# Patient Record
Sex: MALE | Race: ASIAN | NOT HISPANIC OR LATINO
[De-identification: names, ages, dates, MRNs, and addresses within clinical notes are randomized per-mention and may not be internally consistent; named-entity substitution may affect disease eponyms.]

---

## 2018-08-20 ENCOUNTER — APPOINTMENT (OUTPATIENT)
Dept: OPHTHALMOLOGY | Facility: CLINIC | Age: 23
End: 2018-08-20
Payer: COMMERCIAL

## 2018-08-20 PROCEDURE — 92002 INTRM OPH EXAM NEW PATIENT: CPT

## 2018-08-20 PROCEDURE — 87809 ADENOVIRUS ASSAY W/OPTIC: CPT | Mod: QW

## 2018-08-20 RX ORDER — FLUOROMETHOLONE 1 MG/ML
0.1 SOLUTION/ DROPS OPHTHALMIC
Qty: 1 | Refills: 1 | Status: ACTIVE | COMMUNITY
Start: 2018-08-20 | End: 1900-01-01

## 2018-08-29 ENCOUNTER — APPOINTMENT (OUTPATIENT)
Dept: OPHTHALMOLOGY | Facility: CLINIC | Age: 23
End: 2018-08-29
Payer: COMMERCIAL

## 2018-08-29 PROCEDURE — 92012 INTRM OPH EXAM EST PATIENT: CPT

## 2018-09-14 ENCOUNTER — APPOINTMENT (OUTPATIENT)
Dept: OPHTHALMOLOGY | Facility: CLINIC | Age: 23
End: 2018-09-14
Payer: COMMERCIAL

## 2018-09-14 PROCEDURE — 92012 INTRM OPH EXAM EST PATIENT: CPT

## 2018-09-25 ENCOUNTER — APPOINTMENT (OUTPATIENT)
Dept: OPHTHALMOLOGY | Facility: CLINIC | Age: 23
End: 2018-09-25
Payer: COMMERCIAL

## 2018-09-25 DIAGNOSIS — H18.20 UNSPECIFIED CORNEAL EDEMA: ICD-10-CM

## 2018-09-25 DIAGNOSIS — B30.0 KERATOCONJUNCTIVITIS DUE TO ADENOVIRUS: ICD-10-CM

## 2018-09-25 PROCEDURE — 92012 INTRM OPH EXAM EST PATIENT: CPT

## 2018-09-25 PROCEDURE — 92285 EXTERNAL OCULAR PHOTOGRAPHY: CPT

## 2023-12-18 ENCOUNTER — NON-APPOINTMENT (OUTPATIENT)
Age: 28
End: 2023-12-18

## 2023-12-18 ENCOUNTER — APPOINTMENT (OUTPATIENT)
Dept: ORTHOPEDIC SURGERY | Facility: CLINIC | Age: 28
End: 2023-12-18
Payer: COMMERCIAL

## 2023-12-18 ENCOUNTER — TRANSCRIPTION ENCOUNTER (OUTPATIENT)
Age: 28
End: 2023-12-18

## 2023-12-18 DIAGNOSIS — M23.92 UNSPECIFIED INTERNAL DERANGEMENT OF LEFT KNEE: ICD-10-CM

## 2023-12-18 DIAGNOSIS — M25.362 OTHER INSTABILITY, LEFT KNEE: ICD-10-CM

## 2023-12-18 PROCEDURE — 99204 OFFICE O/P NEW MOD 45 MIN: CPT

## 2023-12-18 PROCEDURE — 73564 X-RAY EXAM KNEE 4 OR MORE: CPT | Mod: LT

## 2023-12-18 NOTE — HISTORY OF PRESENT ILLNESS
[de-identified] : 28-year-old male presents complaining of left lateral knee pain that worsened over the past few days. He denies recent injury. He notes that he had left ACL repair in 2018 in [place]. He has had intermittent discomfort over the past few years. He notes posterolateral joint line pain, which worsens with extension. He has full and painless flexion of his knee. He notes his left knee is constantly buckling recently.  He denies swelling. His knee yocasta when he runs and jumps. He has pain when sleeping if it is not elevated. He states he has not been playing basketball since the spring. The patient brought his arthroscopic pictures with him and we went over them in great detail.

## 2023-12-18 NOTE — DISCUSSION/SUMMARY
[de-identified] : I went over the pathophysiology of the patient's symptoms in great detail with the patient. I discussed the underlying pathophysiology of the patient's condition in great detail with the patient. I went over the patient's x-rays with them in great detail. We are requesting authorization for an MRI with metal suppression of the patients left knee. He cannot return to sports because of instability. The patient gets swelling of the left knee due to instability and he gets buckling on a sedentary basis.   All of their questions were answered. They understand and consent to the plan.   FU after MRI

## 2023-12-18 NOTE — ADDENDUM
[FreeTextEntry1] : I, CHALINO PHILLIPS, acted solely as a scribe for Dr. Bradly Andujar on this date 12/18/2023.  All medical record entries made by the Scribe were at my, Dr. Bradly Andujar, direction and personally dictated by me on 12/18/2023. I have reviewed the chart and agree that the record accurately reflects my personal performance of the history, physical exam, assessment and plan. I have also personally directed, reviewed, and agreed with the chart.

## 2023-12-18 NOTE — PHYSICAL EXAM
[de-identified] : Constitutional o Appearance : well-nourished, well developed, alert, in no acute distress  Head and Face o Head :  Inspection : atraumatic, normocephalic o Face :  Inspection : no visible rash or discoloration Respiratory o Respiratory Effort: breathing unlabored  Neurologic o Mental Status Examination :  Orientation : grossly oriented to person, place and time Psychiatric o Mood and Affect: mood normal, affect appropriate   Right Lower Extremity o Buttock : no tenderness, swelling or deformities  o Right Hip :  Inspection/Palpation : no tenderness, swelling or deformities  Range of Motion : full and painless in all planes, no crepitance  Stability : joint stability intact  Strength : extension, flexion, adduction, abduction, internal rotation and external rotation 5/5   o Right Knee :  Inspection/Palpation : no tenderness to palpation, no swelling  Range of Motion : active flexion and extension full and painless, no crepitance  Stability : no valgus or varus instability present on provocative testing  Strength : flexion and extension 5/5  Tests and Signs : Anterior Drawer negative, Lachman negative, Ibrahima's negative  Left Lower Extremity o Buttock : no tenderness, swelling or deformities  o Left Hip :  Inspection/Palpation : no tenderness, no swelling or deformities  Range of Motion : full and painless in all planes, no crepitance  Stability : joint stability intact  Strength : extension, flexion, adduction, abduction, internal rotation and external rotation 5/5  o Left Knee :  Inspection/Palpation : medial compartment tenderness  to palpation, mild swelling, well-healed incision hamstring graph, palpable plicathat is not painful, pivot slide,   Range of Motion : pain with extension, no crepitance, good patellofemoral glide   Stability : mild valgus or varus instability present on provocative testing  Strength : flexion and extension 5/5  Tests and Signs : Anterior Drawer positive, Lachman negative, Ibrahima's negative  Gait: normal gait, no significant extremity swelling or lymphedema  Radiology Results: Left Knee: Standing AP, lateral, tunnel and skyline views were obtained and reveal evidence of ACL reconstruction with endo button of the tibia and femur and no significant degenerative change

## 2024-01-02 ENCOUNTER — APPOINTMENT (OUTPATIENT)
Dept: MRI IMAGING | Facility: CLINIC | Age: 29
End: 2024-01-02

## 2024-01-02 ENCOUNTER — APPOINTMENT (OUTPATIENT)
Dept: MRI IMAGING | Facility: CLINIC | Age: 29
End: 2024-01-02
Payer: COMMERCIAL

## 2024-01-02 PROCEDURE — 73721 MRI JNT OF LWR EXTRE W/O DYE: CPT | Mod: LT

## 2024-01-08 ENCOUNTER — NON-APPOINTMENT (OUTPATIENT)
Age: 29
End: 2024-01-08

## 2024-01-11 ENCOUNTER — APPOINTMENT (OUTPATIENT)
Dept: ORTHOPEDIC SURGERY | Facility: CLINIC | Age: 29
End: 2024-01-11

## 2024-01-16 ENCOUNTER — APPOINTMENT (OUTPATIENT)
Dept: ORTHOPEDIC SURGERY | Facility: CLINIC | Age: 29
End: 2024-01-16
Payer: COMMERCIAL

## 2024-01-16 VITALS — BODY MASS INDEX: 21.97 KG/M2 | HEIGHT: 67 IN | WEIGHT: 140 LBS

## 2024-01-16 PROCEDURE — 99204 OFFICE O/P NEW MOD 45 MIN: CPT

## 2024-01-17 ENCOUNTER — APPOINTMENT (OUTPATIENT)
Dept: ORTHOPEDIC SURGERY | Facility: CLINIC | Age: 29
End: 2024-01-17
Payer: COMMERCIAL

## 2024-01-17 VITALS — WEIGHT: 140 LBS | HEIGHT: 67 IN | BODY MASS INDEX: 21.97 KG/M2

## 2024-01-17 PROCEDURE — 99204 OFFICE O/P NEW MOD 45 MIN: CPT

## 2024-01-17 NOTE — DISCUSSION/SUMMARY
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  Patient presents for evaluation of left knee pain consistent with breakdown of soft tissue graft in the left knee from 2018. The patient also reports symptoms consistent with meniscal pathology in the lateral compartment of the knee with positive provocative meniscal testing as well as joint line tenderness. Patient reports mechanical symptoms. MRi shows prior soft tissue ACL reconstruction with essentially absent graft likely reflecting chronic tearing (some strands appear intact - but not functional given clinical exam). There is also complex tearing of the lateral meniscus/medial meniscus. Questionable discoid variant vs bucket tear. Given the mechanical symptoms and activity level of the patient, surgical intervention is warranted at this time for revision reconstruction and evaluation for partial meniscectomy vs. repair.   All risks, benefits and alternatives to the proposed surgical procedure, left revision ACL reconstruction, partial meniscectomy vs repair and possible lateral extra-articular tenodesis, as well as the need for formal post-operative rehabilitation were discussed in great detail with the patient. Risks include but are not limited to pain, bleeding, infection, neurovascular injury, stiffness, medical complications (including DVT, PE, MI), and risks of anesthesia.   Patient questions and concerns were and answered. He has elected to proceed and will schedule accordingly. Patient is also seeking consultation with Dr. Casey tomorrow also.

## 2024-01-17 NOTE — HISTORY OF PRESENT ILLNESS
[de-identified] : 28-year-old male presents complaining of left lateral knee pain since 12/2023. He denies recent injury. He notes that he had left ACL repair in 2018 in [place]. He has had intermittent discomfort over the past few years. He notes posterolateral joint line pain, which worsens with extension. He has full and painless flexion of his knee. He notes his left knee is constantly buckling recently. He denies swelling. His knee yocasta when he runs and jumps. He has pain when sleeping if it is not elevated. He states he has not been playing basketball since the spring. He saw Dr. Andujar recently obtained MRI and referred here for continuation of care.   The patient's past medical history, past surgical history, medications and allergies were reviewed by me today with the patient and documented accordingly. In addition, the patient's family and social history, which were noncontributory to this visit, were reviewed also.

## 2024-01-17 NOTE — PHYSICAL EXAM
[de-identified] : Left Knee Exam:   Skin: Clean, dry, intact Inspection: No obvious malalignment, no masses, no swelling, no effusion Pulses: 2+ DP/PT pulses ROM: 0-135 degrees of flexion. No pain with deep knee flexion/extension. Tenderness: No MJLT. + posterior LJLT. No pain over the patella facets. No pain to the quadriceps tendon. No pain to the patella tendon. No posterior knee tenderness. Stability: Stable to varus, valgus. IIB Lachman testing. + anterior drawer, +pivot shift. negative posterior drawer. Strength: 5/5 Q/H/TA/GS/EHL, without atrophy Neuro: Intact to light touch throughout, DTRs normal Additional Tests: +Ibrahima's test, Negative patellar grind test Other findings: None. [de-identified] : Images were reviewed dated 12/18/2023  4 views of the left knee that show no acute fracture or dislocation. There is no medial, no lateral and no patellofemoral degenerative changes seen. There is no significant malalignment. S/p ACL reconstriction. Tibial tunnel slightly anterior.   MRI left knee dated 01/02/2024 shows s/p ACL reconstruction with essentially absent graft likely reflecting chronic tearing. Peripheral vertical tearing involves the medial meniscus posterior horn that extends to a horizontal superior articular surface tear of the body. Bucket-handle type tear involves the lateral meniscus with a flap displaced anteriorly and into the intercondylar notch. There is complex tearing of the nondisplaced remnants.  We independently reviewed and discussed in detail the images and the radiologic reports with the patient.

## 2024-01-18 ENCOUNTER — NON-APPOINTMENT (OUTPATIENT)
Age: 29
End: 2024-01-18

## 2024-01-19 ENCOUNTER — OUTPATIENT (OUTPATIENT)
Dept: OUTPATIENT SERVICES | Facility: HOSPITAL | Age: 29
LOS: 1 days | End: 2024-01-19

## 2024-01-19 VITALS
WEIGHT: 145.06 LBS | RESPIRATION RATE: 16 BRPM | HEART RATE: 88 BPM | OXYGEN SATURATION: 96 % | TEMPERATURE: 98 F | DIASTOLIC BLOOD PRESSURE: 80 MMHG | HEIGHT: 67 IN | SYSTOLIC BLOOD PRESSURE: 137 MMHG

## 2024-01-19 DIAGNOSIS — S83.512A SPRAIN OF ANTERIOR CRUCIATE LIGAMENT OF LEFT KNEE, INITIAL ENCOUNTER: ICD-10-CM

## 2024-01-19 DIAGNOSIS — S83.242A OTHER TEAR OF MEDIAL MENISCUS, CURRENT INJURY, LEFT KNEE, INITIAL ENCOUNTER: ICD-10-CM

## 2024-01-19 DIAGNOSIS — Z98.890 OTHER SPECIFIED POSTPROCEDURAL STATES: Chronic | ICD-10-CM

## 2024-01-19 NOTE — H&P PST ADULT - NSICDXPASTMEDICALHX_GEN_ALL_CORE_FT
PAST MEDICAL HISTORY:  Childhood asthma     Other tear of medial meniscus, current injury, left knee, initial encounter     Seasonal allergies     Sprain of anterior cruciate ligament of left knee, initial encounter

## 2024-01-19 NOTE — H&P PST ADULT - PROBLEM SELECTOR PLAN 1
preop for left anterior cruciate ligament reconstruction, bone tendon, bone allograft, extraarticular ligament reconstruction, arthroscopic partial medial and lateral menisectomy on 1/23/24  preop instructions given, pt verbalized understanding  GI prophylaxis and chlorhexidine wash provided

## 2024-01-19 NOTE — H&P PST ADULT - ASSESSMENT
27 y/o male presents to PST preop for left anterior cruciate ligament reconstruction, bone tendon, bone allograft, extraarticular ligament reconstruction, arthroscopic partial medial and lateral menisectomy. Pt c/o left knee pain since Dec 2023. H/o left ACL repair in 2018. Pt reports left knee yocasta with lateral movements. MRI of the left knee on 1/2/24 showed bucket- handle tear.

## 2024-01-19 NOTE — H&P PST ADULT - MUSCULOSKELETAL COMMENTS
preop dx of other tear of medial meniscus, current injury, left knee. see HPI dx of left knee ACL tear.

## 2024-01-19 NOTE — H&P PST ADULT - HISTORY OF PRESENT ILLNESS
29 y/o male presents to PST preop for left anterior cruciate ligament reconstruction, bone tendon, bone allograft, extraarticular ligament reconstruction, arthroscopic partial medial and lateral menisectomy. Pt c/o left knee pain since Dec 2023. H/o left ACL repair in 2018. Pt reports left knee yocasta with lateral movements. MRI of the left knee on 1/2/24 showed bucket- handle tear.

## 2024-01-22 ENCOUNTER — TRANSCRIPTION ENCOUNTER (OUTPATIENT)
Age: 29
End: 2024-01-22

## 2024-01-22 RX ORDER — ASPIRIN 325 MG/1
325 TABLET, FILM COATED ORAL DAILY
Qty: 28 | Refills: 0 | Status: ACTIVE | COMMUNITY
Start: 2024-01-22 | End: 1900-01-01

## 2024-01-22 RX ORDER — OXYCODONE AND ACETAMINOPHEN 5; 325 MG/1; MG/1
5-325 TABLET ORAL
Qty: 30 | Refills: 0 | Status: ACTIVE | COMMUNITY
Start: 2024-01-22 | End: 1900-01-01

## 2024-01-22 NOTE — ASU DISCHARGE PLAN (ADULT/PEDIATRIC) - ACTIVITY LEVEL
Detail Level: Detailed Detail Level: Simple Detail Level: Generalized toe touch weight bearing as tolerated with crutches/No exercise/No heavy lifting/No sports/gym/Weight bearing as tolerated

## 2024-01-22 NOTE — ASU DISCHARGE PLAN (ADULT/PEDIATRIC) - PROCEDURE
Left ACL reconstruction with bone-patellar tendon-bone autograft, lateral extra-articular tenodesis, and partial medial and lateral meniscectomies Left ACL reconstruction with bone-patellar tendon-bone autograft, lateral extra-articular tenodesis, and medial and lateral meniscal repairs

## 2024-01-22 NOTE — ASU DISCHARGE PLAN (ADULT/PEDIATRIC) - ASU DC SPECIAL INSTRUCTIONSFT
Please see handout from Dr. Guillory for specific instructions including follow up. Your medications have already been sent to your pharmacy. Please see handout from Dr. Guillory for specific instructions including follow up. Your medications have already been sent to your pharmacy.  ice to knee as indicated on dr. Mccormick sheet

## 2024-01-22 NOTE — ASU DISCHARGE PLAN (ADULT/PEDIATRIC) - CARE PROVIDER_API CALL
Farzad Guillory  Orthopaedic Surgery  [address], [address]  [place] [address]  Phone: [phone]  Fax: [phone]  Follow Up Time: 2 weeks

## 2024-01-23 ENCOUNTER — APPOINTMENT (OUTPATIENT)
Dept: ORTHOPEDIC SURGERY | Facility: AMBULATORY SURGERY CENTER | Age: 29
End: 2024-01-23

## 2024-01-23 ENCOUNTER — OUTPATIENT (OUTPATIENT)
Dept: OUTPATIENT SERVICES | Facility: HOSPITAL | Age: 29
LOS: 1 days | Discharge: HOME | End: 2024-01-23
Payer: COMMERCIAL

## 2024-01-23 VITALS
HEIGHT: 67 IN | DIASTOLIC BLOOD PRESSURE: 71 MMHG | OXYGEN SATURATION: 97 % | WEIGHT: 145.06 LBS | RESPIRATION RATE: 16 BRPM | HEART RATE: 63 BPM | TEMPERATURE: 98 F | SYSTOLIC BLOOD PRESSURE: 108 MMHG

## 2024-01-23 VITALS
SYSTOLIC BLOOD PRESSURE: 95 MMHG | DIASTOLIC BLOOD PRESSURE: 64 MMHG | HEART RATE: 85 BPM | TEMPERATURE: 98 F | OXYGEN SATURATION: 100 %

## 2024-01-23 DIAGNOSIS — Z98.890 OTHER SPECIFIED POSTPROCEDURAL STATES: Chronic | ICD-10-CM

## 2024-01-23 DIAGNOSIS — S83.512A SPRAIN OF ANTERIOR CRUCIATE LIGAMENT OF LEFT KNEE, INITIAL ENCOUNTER: ICD-10-CM

## 2024-01-23 PROCEDURE — 27427 RECONSTRUCTION KNEE: CPT | Mod: LT

## 2024-01-23 PROCEDURE — 29888 ARTHRS AID ACL RPR/AGMNTJ: CPT | Mod: LT,59

## 2024-01-23 PROCEDURE — 29883 ARTHRS KNE SRG MNISC RPR M&L: CPT | Mod: LT

## 2024-01-23 DEVICE — IMP FDL 5.5MM: Type: IMPLANTABLE DEVICE | Status: FUNCTIONAL

## 2024-01-23 DEVICE — SCREW CANN 8 X25MM: Type: IMPLANTABLE DEVICE | Status: FUNCTIONAL

## 2024-01-23 DEVICE — FLEXIBLE PASSING PIN 13.50X2.4MM: Type: IMPLANTABLE DEVICE | Status: FUNCTIONAL

## 2024-01-23 DEVICE — SCREW CANN 8 X 20MM 1.5: Type: IMPLANTABLE DEVICE | Status: FUNCTIONAL

## 2024-01-23 DEVICE — S&N FASTFIX 360 CURVED: Type: IMPLANTABLE DEVICE | Status: FUNCTIONAL

## 2024-01-23 RX ORDER — FEXOFENADINE HCL 30 MG
1 TABLET ORAL
Refills: 0 | DISCHARGE

## 2024-01-23 RX ORDER — ACETAMINOPHEN 500 MG
2 TABLET ORAL
Refills: 0 | DISCHARGE

## 2024-01-23 NOTE — BRIEF OPERATIVE NOTE - OPERATION/FINDINGS
Revision L ACL reconstruction w/ BTB autograft, medial and lateral meniscal repairs, and LET via modified Kristi technique

## 2024-01-23 NOTE — ASU PREOPERATIVE ASSESSMENT, ADULT ([facility] ONLY) - FALL HARM RISK - UNIVERSAL INTERVENTIONS
Bed in lowest position, wheels locked, appropriate side rails in place/Call bell, personal items and telephone in reach/Instruct patient to call for assistance before getting out of bed or chair/Non-slip footwear when patient is out of bed/[place] to call system/Physically safe environment - no spills, clutter or unnecessary equipment/Purposeful Proactive Rounding/Room/bathroom lighting operational, light cord in reach

## 2024-02-03 ENCOUNTER — NON-APPOINTMENT (OUTPATIENT)
Age: 29
End: 2024-02-03

## 2024-02-05 ENCOUNTER — APPOINTMENT (OUTPATIENT)
Dept: ORTHOPEDIC SURGERY | Facility: CLINIC | Age: 29
End: 2024-02-05
Payer: COMMERCIAL

## 2024-02-05 PROCEDURE — 99024 POSTOP FOLLOW-UP VISIT: CPT

## 2024-02-05 NOTE — ADDENDUM
[FreeTextEntry1] : This note was written by Mireya Garcia on 02/05/2024 acting solely as a scribe for Dr. Farzad Guillory.  All medical record entries made by the Scribe were at my, Dr. Farzad Guillory, direction and personally dictated by me on 02/05/2024. I have personally reviewed the chart and agree that the record accurately reflects my personal performance of the history, physical exam, assessment and plan. 
denies pain/discomfort

## 2024-02-05 NOTE — HISTORY OF PRESENT ILLNESS
[Clean/Dry/Intact] : clean, dry and intact [Healed] : healed [Neuro Intact] : an unremarkable neurological exam [Vascular Intact] : ~T peripheral vascular exam normal [Doing Well] : is doing well [Excellent Pain Control] : has excellent pain control [No Sign of Infection] : is showing no signs of infection [Sutures Removed] : sutures were removed [Steri-Strips Removed & Replaced] : steri-strips removed and replaced [Chills] : no chills [Fever] : no fever [Nausea] : no nausea [Vomiting] : no vomiting [Erythema] : not erythematous [Discharge] : absent of discharge [Swelling] : not swollen [Dehiscence] : not dehisced [de-identified] : 29 y/o male s/p left knee revACL (BTB auto), MMR, LMR, lateral extra-articular tenodesis 1/23/24 [de-identified] : 27 y/o male s/p left knee revACL (BTB auto), MMR, LMR, lateral extra-articular tenodesis. He is doing well. Presents in [place] brace and crutches. he is not taking pain medication. Denies post op complications.  [de-identified] : Left Knee Exam:  Skin: Incision(s) Clean, dry, intact, no drainage, healed Inspection: Residual swelling, moderate residual effusion, ecchymosis Pulses: 2+ DP/PT pulses  ROM: Not tested [left/right] Tenderness: Tender throughout anterior knee joint. Stability: Stable Strength: Intact Q/H/TA/GS/EHL Neuro: Intact to light touch throughout [de-identified] : 27 y/o male s/p left knee revACL (BTB auto), MMR, LMR, lateral extra-articular tenodesis.   All intraoperative imaging was discussed in detail with the patient. All questions were answered.   Recommendations:  1. PT evaluation and treatment; including AAROM/AROM, therapeutic exercise (include hamstring and quadriceps strengthening), heel slides, nonweightbearing stretch of the gastrocsoleus complex and straight leg raises to prevent extension lag. Progression to closed chain exercises/balance exercise/bike in 2-4 weeks. 2. Brace: Unlock brace for ambulation as tolerated. Remove the brace for sleeping as tolerated. May discontinue brace once full extension and without extensor lag has been achieved (approx 4-6wks post-op) 3. Meds: Continue [id number] daily, pain medication prn, may transition to NSAIDS. 4. Ice/elevate as needed and 5. Restrictions: TTWB x4wks.   Followup in 4 weeks for imaging and clinical evaluation of stability.

## 2024-02-12 ENCOUNTER — APPOINTMENT (OUTPATIENT)
Dept: ORTHOPEDIC SURGERY | Facility: CLINIC | Age: 29
End: 2024-02-12

## 2024-02-23 PROBLEM — J30.2 OTHER SEASONAL ALLERGIC RHINITIS: Chronic | Status: ACTIVE | Noted: 2024-01-19

## 2024-02-23 PROBLEM — S83.512A SPRAIN OF ANTERIOR CRUCIATE LIGAMENT OF LEFT KNEE, INITIAL ENCOUNTER: Chronic | Status: ACTIVE | Noted: 2024-01-19

## 2024-02-23 PROBLEM — J45.909 UNSPECIFIED ASTHMA, UNCOMPLICATED: Chronic | Status: ACTIVE | Noted: 2024-01-19

## 2024-02-23 PROBLEM — S83.242A OTHER TEAR OF MEDIAL MENISCUS, CURRENT INJURY, LEFT KNEE, INITIAL ENCOUNTER: Chronic | Status: ACTIVE | Noted: 2024-01-19

## 2024-03-05 ENCOUNTER — APPOINTMENT (OUTPATIENT)
Dept: ORTHOPEDIC SURGERY | Facility: CLINIC | Age: 29
End: 2024-03-05
Payer: COMMERCIAL

## 2024-03-05 VITALS — BODY MASS INDEX: 22.76 KG/M2 | WEIGHT: 145 LBS | HEIGHT: 67 IN

## 2024-03-05 PROCEDURE — 73562 X-RAY EXAM OF KNEE 3: CPT | Mod: LT

## 2024-03-05 PROCEDURE — 99024 POSTOP FOLLOW-UP VISIT: CPT

## 2024-03-05 NOTE — ADDENDUM
[FreeTextEntry1] : This note was written by Mickey Michael on 03/05/2024 acting solely as a scribe for Dr. Farzad Guillory.   All medical record entries made by the Scribe were at my, Dr. Farzad Guillory, direction and personally dictated by me on 03/05/2024. I have personally reviewed the chart and agree that the record accurately reflects my personal performance of the history, physical exam, assessment and plan.

## 2024-03-05 NOTE — HISTORY OF PRESENT ILLNESS
[0] : no pain reported [Clean/Dry/Intact] : clean, dry and intact [Healed] : healed [Neuro Intact] : an unremarkable neurological exam [Vascular Intact] : ~T peripheral vascular exam normal [Doing Well] : is doing well [Excellent Pain Control] : has excellent pain control [No Sign of Infection] : is showing no signs of infection [Chills] : no chills [Fever] : no fever [Nausea] : no nausea [Vomiting] : no vomiting [Erythema] : not erythematous [Discharge] : absent of discharge [Swelling] : not swollen [Dehiscence] : not dehisced [de-identified] : 27 y/o male s/p left knee revACL (BTB auto), MMR, LMR, lateral extra-articular tenodesis 1/23/24 [de-identified] : 27 y/o male s/p left knee revACL (BTB auto), MMR, LMR, lateral extra-articular tenodesis. He is doing well. Presents in [place] brace . He is attending PT 2x per week. He is not taking pain medication. Denies post op complications.  [de-identified] : Left Knee Exam:  Skin: Incision(s) Clean, dry, intact, no drainage, healed Inspection: Residual swelling, moderate residual effusion, ecchymosis Pulses: 2+ DP/PT pulses  ROM: 3 to 85 degrees of flexion Tenderness: Tender throughout anterior knee joint. Stability: Stable Strength: Intact Q/H/TA/GS/EHL Neuro: Intact to light touch throughout [de-identified] : The following radiographs were ordered and read by me during this patients visit. I reviewed each radiograph in detail with the patient and discussed the findings as highlighted below.   2 views of the left knee were obtained today, 3/5/24, that show interval change of an anatomic anterior cruciate ligament reconstruction/LET [de-identified] : 29 y/o male s/p left knee revACL (BTB auto), MMR, LMR, lateral extra-articular tenodesis.   Postoperative imaging shows anatomic ACL reconstruction. Patient has good clinical stability on examination, and is doing well with postoperative rehabilitation at this time.   Recommendations:  1. Continue PT per protocol; WBAT, closed chain exercises, proprioception exercise, begin elliptical when tolerated, hamstring stretching/strengthening 2. Brace: May discontinue brace once full extension and without extensor lag has been achieved 3. Meds: Discontinue [id number] daily, OTC pain/Nsaids medication prn 4. Continue symptommatic Ice/elevate as needed 5. Restrictions: As discussed  Followup in 6 weeks.

## 2024-04-16 ENCOUNTER — APPOINTMENT (OUTPATIENT)
Dept: ORTHOPEDIC SURGERY | Facility: CLINIC | Age: 29
End: 2024-04-16
Payer: COMMERCIAL

## 2024-04-16 DIAGNOSIS — S83.252A BUCKET-HANDLE TEAR OF LATERAL MENISCUS, CURRENT INJURY, LEFT KNEE, INITIAL ENCOUNTER: ICD-10-CM

## 2024-04-16 DIAGNOSIS — S83.222A PERIPHERAL TEAR OF MEDIAL MENISCUS, CURRENT INJURY, LEFT KNEE, INITIAL ENCOUNTER: ICD-10-CM

## 2024-04-16 DIAGNOSIS — S83.512A SPRAIN OF ANTERIOR CRUCIATE LIGAMENT OF LEFT KNEE, INITIAL ENCOUNTER: ICD-10-CM

## 2024-04-16 PROCEDURE — 73562 X-RAY EXAM OF KNEE 3: CPT | Mod: LT

## 2024-04-16 PROCEDURE — 99024 POSTOP FOLLOW-UP VISIT: CPT

## 2024-04-17 PROBLEM — S83.252A BUCKET HANDLE TEAR OF LATERAL MENISCUS OF LEFT KNEE: Status: ACTIVE | Noted: 2024-01-17

## 2024-04-17 PROBLEM — S83.222A: Status: ACTIVE | Noted: 2024-01-17

## 2024-04-17 PROBLEM — S83.512A RUPTURE OF ANTERIOR CRUCIATE LIGAMENT OF LEFT KNEE, INITIAL ENCOUNTER: Status: ACTIVE | Noted: 2024-01-17

## 2024-04-17 NOTE — HISTORY OF PRESENT ILLNESS
[0] : no pain reported [Clean/Dry/Intact] : clean, dry and intact [Healed] : healed [Neuro Intact] : an unremarkable neurological exam [Vascular Intact] : ~T peripheral vascular exam normal [Doing Well] : is doing well [Excellent Pain Control] : has excellent pain control [No Sign of Infection] : is showing no signs of infection [Chills] : no chills [Fever] : no fever [Nausea] : no nausea [Vomiting] : no vomiting [Erythema] : not erythematous [Discharge] : absent of discharge [Swelling] : not swollen [Dehiscence] : not dehisced [de-identified] : 29 y/o male s/p left knee revACL (BTB auto), MMR, LMR, lateral extra-articular tenodesis 1/23/24 [de-identified] : 27 y/o male s/p left knee revACL (BTB auto), MMR, LMR, lateral extra-articular tenodesis. He is doing well. He is attending PT 2x per week. Working on strength adn ROM.  He is not taking pain medication. Denies post op complications.  [de-identified] : Left Knee Exam:  Skin: Incision(s) Clean, dry, intact, no drainage, healed Inspection: min swelling/residual effusion Pulses: 2+ DP/PT pulses  ROM: 0 to 100 degrees of flexion Tenderness: min tender throughout anterior knee joint. Stability: Stable, IA Lachman testing. Strength: Intact Q/H/TA/GS/EHL Neuro: Intact to light touch throughout [de-identified] : 29 y/o male s/p left knee revACL (BTB auto), MMR, LMR, lateral extra-articular tenodesis.   Patient has good clinical stability on examination, and is doing well with postoperative rehabilitation at this time.   Recommendations:   1. Continue PT per protocol; Progress to terminal ROM as tolerated. Continue hamstring/quad strengthening, advance closed chain exercises, proprioception exercise. Begin running/impact loading program. Progression of sport specific endurance/strengthening/sport specific drills. Goal of return to sport at 9-12mos following confirmation of strength, confidence and agility with completion of an RTP program. 2. Brace: OTC knee sleeve as needed 3. Meds: PRN use NSAIDs/Tylenol 4. Continue symptomatic Ice/elevate as needed 5. Restrictions: None   Followup in 3 mos.

## 2024-04-17 NOTE — ADDENDUM
[FreeTextEntry1] : This note was written by Mireya Garcia on 04/16/2024 acting solely as a scribe for Dr. Farzad Guillory.  All medical record entries made by the Scribe were at my, Dr. Farzad Guillory, direction and personally dictated by me on 04/16/2024. I have personally reviewed the chart and agree that the record accurately reflects my personal performance of the history, physical exam, assessment and plan.

## 2024-04-19 ENCOUNTER — NON-APPOINTMENT (OUTPATIENT)
Age: 29
End: 2024-04-19

## 2024-04-28 ENCOUNTER — NON-APPOINTMENT (OUTPATIENT)
Age: 29
End: 2024-04-28

## 2024-07-23 ENCOUNTER — NON-APPOINTMENT (OUTPATIENT)
Age: 29
End: 2024-07-23

## (undated) DEVICE — SUT VICRYL 2-0 27" CT-2 UNDYED

## (undated) DEVICE — S&N FASTFIX 360 CURVED KNOT PUSHER SET

## (undated) DEVICE — TUBING DEPUY MITEK FMS OUTFLOW

## (undated) DEVICE — DRSG STERISTRIPS 0.25 X 3"

## (undated) DEVICE — SHAVER BLADE S&N FULL RADIUS BONECUTTER 5.5MM (ORANGE)

## (undated) DEVICE — POSITIONER FOAM EGG CRATE ULNAR 2PCS (PINK)

## (undated) DEVICE — PACK KNEE ARTHROSCOPY

## (undated) DEVICE — SUT ETHIBOND 2 30" V37

## (undated) DEVICE — BLADE SURGICAL #15 CARBON

## (undated) DEVICE — SUT LASSO CRESCENT

## (undated) DEVICE — POSITIONER PATIENT SAFETY STRAP 3X60"

## (undated) DEVICE — TUBING DEPUY MITEK FMS INFLOW

## (undated) DEVICE — GLV 8 PROTEXIS (CREAM) NEU-THERA

## (undated) DEVICE — TOURNIQUET ESMARK 6"

## (undated) DEVICE — SUT ETHIBOND 5 4-30" CCS

## (undated) DEVICE — PACK MINOR NO DRAPE

## (undated) DEVICE — VENODYNE/SCD SLEEVE CALF MEDIUM

## (undated) DEVICE — NDL HYPO SAFE 21G X 1.5" (GREEN)

## (undated) DEVICE — SUT FIBERWIRE LOOP 2 STRAIGHT NDL 15"

## (undated) DEVICE — ELCTR ROCKER SWITCH PENCIL BLUE 10FT

## (undated) DEVICE — SAW BLADE MICROAIRE SAGITTAL 9.4MMX25.4MMX0.6MM

## (undated) DEVICE — POSITIONER FOAM S&N INSERT FOR LEG HOLDER (WHITE)

## (undated) DEVICE — SUT MONOCRYL 4-0 18" PS-2

## (undated) DEVICE — NDL SPINAL 18G X 3.5" (PINK)

## (undated) DEVICE — FLOORMAT STRYKER SUCTION LOW PROFILE 50X34

## (undated) DEVICE — SUT NYLON 3-0 18" PS-2

## (undated) DEVICE — DEPUY ACL GRAFT KNIFE 10MM

## (undated) DEVICE — SHAVER BLADE S&N FULL RADIUS 3.5MM STRAIGHT (BEIGE)

## (undated) DEVICE — SOL IRR BAG NS 0.9% 3000ML

## (undated) DEVICE — WARMING BLANKET UPPER ADULT

## (undated) DEVICE — DRAPE 3/4 SHEET 52X76"

## (undated) DEVICE — SUT VICRYL 0 18" CT-1 (POP-OFF)

## (undated) DEVICE — ARTHREX KIT ACL TRANSTIBIAL WITHOUT SAW BLADE